# Patient Record
Sex: MALE | Race: WHITE | NOT HISPANIC OR LATINO | Employment: OTHER | URBAN - NONMETROPOLITAN AREA
[De-identification: names, ages, dates, MRNs, and addresses within clinical notes are randomized per-mention and may not be internally consistent; named-entity substitution may affect disease eponyms.]

---

## 2021-11-17 ENCOUNTER — TRANSFERRED RECORDS (OUTPATIENT)
Dept: HEALTH INFORMATION MANAGEMENT | Facility: OTHER | Age: 56
End: 2021-11-17
Payer: COMMERCIAL

## 2021-11-17 LAB
ALT SERPL-CCNC: 35 U/L (ref 0–50)
AST SERPL-CCNC: 35 U/L (ref 9–34)
CHOLESTEROL (EXTERNAL): 283 MG/DL (ref 0–200)
CREATININE (EXTERNAL): 0.8 MG/DL (ref 0.7–1.4)
GFR ESTIMATED (EXTERNAL): >90 ML/MIN/1.73M2
GFR ESTIMATED (IF AFRICAN AMERICAN) (EXTERNAL): >90 ML/MIN/1.73M2
GLUCOSE (EXTERNAL): 146 MG/DL (ref 64–112)
HDLC SERPL-MCNC: 55 MG/DL (ref 30–70)
LDL CHOLESTEROL (EXTERNAL): 193 MG/DL (ref 0–130)
POTASSIUM (EXTERNAL): 4.7 MMOL/L (ref 3.5–5.3)
TRIGLYCERIDES (EXTERNAL): 176 MG/DL (ref 40–197)

## 2021-11-18 ENCOUNTER — MEDICAL CORRESPONDENCE (OUTPATIENT)
Dept: HEALTH INFORMATION MANAGEMENT | Facility: OTHER | Age: 56
End: 2021-11-18
Payer: COMMERCIAL

## 2021-11-24 ENCOUNTER — OFFICE VISIT (OUTPATIENT)
Dept: UROLOGY | Facility: OTHER | Age: 56
End: 2021-11-24
Attending: UROLOGY
Payer: COMMERCIAL

## 2021-11-24 VITALS
HEART RATE: 81 BPM | SYSTOLIC BLOOD PRESSURE: 138 MMHG | OXYGEN SATURATION: 97 % | DIASTOLIC BLOOD PRESSURE: 88 MMHG | WEIGHT: 299.4 LBS | RESPIRATION RATE: 16 BRPM

## 2021-11-24 DIAGNOSIS — R97.20 ELEVATED PSA: Primary | ICD-10-CM

## 2021-11-24 LAB — PSA SERPL-MCNC: 12.55 UG/L (ref 0–4)

## 2021-11-24 PROCEDURE — 36415 COLL VENOUS BLD VENIPUNCTURE: CPT | Mod: ZL | Performed by: UROLOGY

## 2021-11-24 PROCEDURE — G0463 HOSPITAL OUTPT CLINIC VISIT: HCPCS | Performed by: UROLOGY

## 2021-11-24 PROCEDURE — 99204 OFFICE O/P NEW MOD 45 MIN: CPT | Performed by: UROLOGY

## 2021-11-24 PROCEDURE — G0103 PSA SCREENING: HCPCS | Mod: ZL | Performed by: UROLOGY

## 2021-11-24 ASSESSMENT — PAIN SCALES - GENERAL: PAINLEVEL: NO PAIN (0)

## 2021-11-24 NOTE — NURSING NOTE
Chief Complaint   Patient presents with     Consult For     elevated PSA   Patient presents to the clinic today for a consult for elevated PSA.    Review of Systems:    Weight loss:    No     Recent fever/chills:  No   Night sweats:   No  Current skin rash:  No   Recent hair loss:  No  Heat intolerance:  No   Cold intolerance:  No  Chest pain:   No   Palpitations:   No  Shortness of breath:  No   Wheezing:   No  Constipation:    No   Diarrhea:   No   Nausea:   No   Vomiting:   No   Kidney/side pain:  No   Back pain:   No  Frequent headaches:  No   Dizziness:     No  Leg swelling:   No   Calf pain:    No    Parents, brothers or sisters with history of kidney cancer:   No  Parents, brothers or sisters with history of bladder cancer: No       Medication Reconciliation: completed   Elizabet Wu LPN  11/24/2021 1:08 PM

## 2021-11-24 NOTE — PROGRESS NOTES
Type of Visit  NPV    Chief Complaint  Elevated PSA    HPI  Mr. Juan is a 56 year old male who presents with an elevated PSA.  He does not have a family history of prostate cancer.  The patient has not previously undergone prostate biopsy.  No associated worsening LUTS, dysuria or prostatitis at the time of the PSA.  The most recent PSA was collected 1 week ago.    Has minimal urinary complaints.  Denies gross hematuria and dysuria.  Also underwent a UA at the time of the PSA which was negative.      Past Medical History  He  has no past medical history on file.  There is no problem list on file for this patient.      Past Surgical History  He  has no past surgical history on file.    Medications  He currently has no medications in their medication list.    Allergies  No Known Allergies    Social History  He  reports that he has never smoked. His smokeless tobacco use includes snuff. He reports previous alcohol use. He reports that he does not use drugs.  No drug abuse.    Family History  No family history on file.    Review of Systems  I personally reviewed the ROS with the patient.    Nursing Notes:   Elizabet Wu LPN  11/24/2021  1:17 PM  Signed  Chief Complaint   Patient presents with     Consult For     elevated PSA   Patient presents to the clinic today for a consult for elevated PSA.    Review of Systems:    Weight loss:    No     Recent fever/chills:  No   Night sweats:   No  Current skin rash:  No   Recent hair loss:  No  Heat intolerance:  No   Cold intolerance:  No  Chest pain:   No   Palpitations:   No  Shortness of breath:  No   Wheezing:   No  Constipation:    No   Diarrhea:   No   Nausea:   No   Vomiting:   No   Kidney/side pain:  No   Back pain:   No  Frequent headaches:  No   Dizziness:     No  Leg swelling:   No   Calf pain:    No    Parents, brothers or sisters with history of kidney cancer:   No  Parents, brothers or sisters with history of bladder cancer: No       Medication Reconciliation:  completed   Elizabet WuJUSTINE  11/24/2021 1:08 PM       Physical Exam  Vitals:    11/24/21 1308   BP: 138/88   BP Location: Right arm   Patient Position: Sitting   Cuff Size: Adult Large   Pulse: 81   Resp: 16   SpO2: 97%   Weight: 135.8 kg (299 lb 6.4 oz)     Constitutional: No acute distress.  Alert and cooperative   Head: NCAT  Eyes: Conjunctivae normal  Cardiovascular: Regular rate.  Pulmonary/Chest: Respirations are even and non-labored bilaterally, no audible wheezing  Abdominal: Soft. No distension, tenderness, masses or guarding.   Neurological: A + O x 3.  Cranial Nerves II-XII grossly intact.  Extremities: ROSANNE x 4, Warm. No clubbing.  No cyanosis.    Skin: Pink, warm and dry.  No visible rashes noted.  Psychiatric:  Normal mood and affect  Back:  No left CVA tenderness.  No right CVA tenderness.  Genitourinary:  Nonpalpable bladder    Labs  PSA  14.5     11/17/2021    UA  Negative (LE, nitrite and blood) 11/17/2021    Imaging  None    Assessment  Mr. Juan is a 56 year old male who presents with elevated PSA.    We discussed options regarding the elevated PSA including continuing to check serial levels, [facility] urine testing to further stratify his personal risk level, MRI of the prostate, prostate ultrasound.    We discussed the risks of biopsy possibly leading to over diagnosis and over treatment.    I explained to the patient that an elevated PSA is a marker of risk of prostate cancer and a prostate biopsy is often the next consideration in diagnosis.  I explained that sampling error can occur with any biopsy and there is a risk of potentially missing a cancer that may be present.    I discussed the risks, benefits, and alternatives to prostate biopsy, including hematuria, hematochezia, and hematospermia.  I also discussed the risk of diagnosing a clinically-insignificant prostate cancer.  I discussed the risks of sepsis, which can be minimized by prophylactic antibiotics.     Plan  Repeat PSA    Prostate

## 2021-12-08 ENCOUNTER — OFFICE VISIT (OUTPATIENT)
Dept: UROLOGY | Facility: OTHER | Age: 56
End: 2021-12-08
Attending: UROLOGY
Payer: COMMERCIAL

## 2021-12-08 ENCOUNTER — HOSPITAL ENCOUNTER (OUTPATIENT)
Dept: MRI IMAGING | Facility: OTHER | Age: 56
End: 2021-12-08
Attending: UROLOGY
Payer: COMMERCIAL

## 2021-12-08 ENCOUNTER — TELEPHONE (OUTPATIENT)
Dept: UROLOGY | Facility: OTHER | Age: 56
End: 2021-12-08

## 2021-12-08 VITALS
WEIGHT: 300 LBS | RESPIRATION RATE: 16 BRPM | OXYGEN SATURATION: 94 % | SYSTOLIC BLOOD PRESSURE: 138 MMHG | HEART RATE: 94 BPM | DIASTOLIC BLOOD PRESSURE: 80 MMHG

## 2021-12-08 DIAGNOSIS — R97.20 ELEVATED PSA: ICD-10-CM

## 2021-12-08 DIAGNOSIS — R97.20 ELEVATED PSA: Primary | ICD-10-CM

## 2021-12-08 PROCEDURE — G0463 HOSPITAL OUTPT CLINIC VISIT: HCPCS | Mod: 25

## 2021-12-08 PROCEDURE — G0463 HOSPITAL OUTPT CLINIC VISIT: HCPCS

## 2021-12-08 PROCEDURE — 72197 MRI PELVIS W/O & W/DYE: CPT

## 2021-12-08 PROCEDURE — 255N000002 HC RX 255 OP 636: Performed by: UROLOGY

## 2021-12-08 PROCEDURE — 99214 OFFICE O/P EST MOD 30 MIN: CPT | Performed by: UROLOGY

## 2021-12-08 PROCEDURE — A9575 INJ GADOTERATE MEGLUMI 0.1ML: HCPCS | Performed by: UROLOGY

## 2021-12-08 RX ORDER — TAMSULOSIN HYDROCHLORIDE 0.4 MG/1
0.4 CAPSULE ORAL DAILY
COMMUNITY

## 2021-12-08 RX ORDER — UBIDECARENONE 100 MG
100 CAPSULE ORAL DAILY
COMMUNITY

## 2021-12-08 RX ORDER — CIPROFLOXACIN 500 MG/1
TABLET, FILM COATED ORAL
Qty: 6 TABLET | Refills: 0 | Status: SHIPPED | OUTPATIENT
Start: 2021-12-08

## 2021-12-08 RX ORDER — MULTIVITAMIN
1 TABLET ORAL DAILY
COMMUNITY

## 2021-12-08 RX ADMIN — GADOTERATE MEGLUMINE 20 ML: 376.9 INJECTION INTRAVENOUS at 16:28

## 2021-12-08 ASSESSMENT — PAIN SCALES - GENERAL: PAINLEVEL: NO PAIN (0)

## 2021-12-08 NOTE — NURSING NOTE
Pt presents to clinic for MRI results    Review of Systems:    Weight loss:    No     Recent fever/chills:  No   Night sweats:   No  Current skin rash:  No   Recent hair loss:  No  Heat intolerance:  No   Cold intolerance:  No  Chest pain:   No   Palpitations:   No  Shortness of breath:  No   Wheezing:   No  Constipation:    No   Diarrhea:   No   Nausea:   No   Vomiting:   No   Kidney/side pain:  No   Back pain:   No  Frequent headaches:  Yes   Dizziness:     No  Leg swelling:   No   Calf pain:    No

## 2021-12-08 NOTE — TELEPHONE ENCOUNTER
Pharmacy called with a questions on the Ciprofloxacin instructions verses the quantity.     Please call [facility] option 2    Leslie Grey on 12/8/2021 at 4:52 PM

## 2021-12-08 NOTE — PROGRESS NOTES
Type of Visit  EST    Chief Complaint  Elevated PSA    HPI  Mr. Juan is a 56 year old male who follows up with an elevated PSA.  He does not have a family history of prostate cancer.  The patient has not previously undergone prostate biopsy.  No associated worsening LUTS, dysuria or prostatitis at the time of the PSA.  The most recent PSA was collected 3 weeks ago.  At the last visit we elected proceed with MRI prostate.  He underwent the imaging earlier today and follows up to discuss the results.    Has minimal urinary complaints.  Denies gross hematuria and dysuria.  Also underwent a UA at the time of the PSA which was negative.      Past Medical History  He  has no past medical history on file.  There is no problem list on file for this patient.    Past Surgical History  He  has no past surgical history on file.    Medications  He has a current medication list which includes the following prescription(s): ciprofloxacin, co-enzyme q-10, garlic, misc natural products, multiple vitamin  tablet, and tamsulosin.    Allergies  No Known Allergies    Social History  He  reports that he has never smoked. His smokeless tobacco use includes snuff. He reports previous alcohol use. He reports that he does not use drugs.  No drug abuse.    Family History  History reviewed. No pertinent family history.    Review of Systems  I personally reviewed the ROS with the patient.    Nursing Notes:   Laurie Pena LPN  12/8/2021  2:46 PM  Addendum  Pt presents to clinic for MRI results    Review of Systems:    Weight loss:    No     Recent fever/chills:  No   Night sweats:   No  Current skin rash:  No   Recent hair loss:  No  Heat intolerance:  No   Cold intolerance:  No  Chest pain:   No   Palpitations:   No  Shortness of breath:  No   Wheezing:   No  Constipation:    No   Diarrhea:   No   Nausea:   No   Vomiting:   No   Kidney/side pain:  No   Back pain:   No  Frequent headaches:  Yes   Dizziness:     No  Leg  swelling:   No   Calf pain:    No          Physical Exam  Vitals:    12/08/21 1447   BP: 138/80   BP Location: Right arm   Patient Position: Sitting   Cuff Size: Adult Large   Pulse: 94   Resp: 16   SpO2: 94%   Weight: 136.1 kg (300 lb)   Constitutional: No acute distress.  Alert and cooperative   Head: NCAT  Eyes: Conjunctivae normal  Cardiovascular: Regular rate.  Pulmonary/Chest: Respirations are even and non-labored bilaterally, no audible wheezing  Abdominal: Soft. No distension, tenderness, masses or guarding.   Neurological: A + O x 3.  Cranial Nerves II-XII grossly intact.  Extremities: ROSANNE x 4, Warm. No clubbing.  No cyanosis.    Skin: Pink, warm and dry.  No visible rashes noted.  Psychiatric:  Normal mood and affect  Back:  No left CVA tenderness.  No right CVA tenderness.  Genitourinary:  Nonpalpable bladder    Labs  Results for JOHNNA GLORIA (MRN [id number]) as of 12/8/2021 14:55   11/24/2021 13:44   PSA 12.55 (H)     PSA  14.5     11/17/2021    UA  Negative (LE, nitrite and blood) 11/17/2021    Imaging  Prostate MR  12/8/2021  IMPRESSION:  Marked prostatomegaly.   Two intermediate transitional zone lesions.  Assessment: PI-RADS 3    Assessment  Mr. Gloria is a 56 year old male who follows up after undergoing a dedicated prostate MRI for an elevated PSA.  Reviewed the recently acquired MRI.  Discussed the PI-RADS prostate lesion definition.    We discussed the options of expectant management versus further diagnostic testing.  Given his PSA value, trend and his relatively young age and good health we decided to proceed with MRI guided prostate biopsy.    I explained that sampling error can occur with any biopsy and there is a risk of potentially missing a cancer that may be present, particularly in the anterior zone, however the goal of the MRI guided prostate biopsy is to minimize this risk.    Plan  MRI guided prostate biopsy in the OR under MAC sedation   -Cipro x 3 days provided for  prophylaxis  Follow up after to discuss prostate biopsy results.

## 2021-12-08 NOTE — Clinical Note
I just called patient and no answer.  Trying to update him on the MRI Results revealing 2 PI-RADS 3 lesions.  I explained to him the PI-RADS scoring system and explained that PI-RADS 3 or lower risk lesions.  Once we figure out the next MRI guided prostate biopsy day we will let him know.

## 2021-12-09 ENCOUNTER — TELEPHONE (OUTPATIENT)
Dept: UROLOGY | Facility: OTHER | Age: 56
End: 2021-12-09
Payer: COMMERCIAL

## 2021-12-09 RX ORDER — GENTAMICIN SULFATE 60 MG/50ML
120 INJECTION, SOLUTION INTRAVENOUS
Status: CANCELLED | OUTPATIENT
Start: 2021-12-10

## 2021-12-09 NOTE — TELEPHONE ENCOUNTER
Patient left a message stating he would like a return call regarding his MRI results. Please contact patient.    Nova Good on 12/9/2021 at 3:06 PM

## 2021-12-09 NOTE — TELEPHONE ENCOUNTER
"Called patient and per Dr Cheung    \" update him on the MRI Results revealing 2 PI-RADS 3 lesions.  I explained to him the PI-RADS scoring system and explained that PI-RADS 3 or lower risk lesions.  Once we figure out the next MRI guided prostate biopsy day we will let him know. \"  Urology will call patient when date is set for machine to come up from [facility]    "

## 2021-12-15 NOTE — TELEPHONE ENCOUNTER
Spoke with Faustina at [facility] she questioned the sig on script.   Writer explained that it is one tablet after the first 2  Laurie Pena LPN.......12/15/2021 10:50 AM

## 2022-01-12 ENCOUNTER — HOSPITAL ENCOUNTER (OUTPATIENT)
Facility: OTHER | Age: 57
End: 2022-01-12
Attending: UROLOGY | Admitting: UROLOGY
Payer: COMMERCIAL